# Patient Record
Sex: MALE | Race: WHITE
[De-identification: names, ages, dates, MRNs, and addresses within clinical notes are randomized per-mention and may not be internally consistent; named-entity substitution may affect disease eponyms.]

---

## 2017-09-22 ENCOUNTER — HOSPITAL ENCOUNTER (OUTPATIENT)
Dept: HOSPITAL 45 - C.RAD1850 | Age: 57
Discharge: HOME | End: 2017-09-22
Attending: INTERNAL MEDICINE
Payer: COMMERCIAL

## 2017-09-22 DIAGNOSIS — D86.9: Primary | ICD-10-CM

## 2017-09-22 NOTE — DIAGNOSTIC IMAGING REPORT
CHEST 2 VIEWS ROUTINE



CLINICAL HISTORY: D86.9 [id number]    



COMPARISON STUDY:  7/5/2016



FINDINGS: The cardiac and mediastinal contours are normal. There is no evidence

of focal pulmonary consolidation. There is no evidence of failure. No pleural

effusions are visualized.[ 



IMPRESSION: No active disease in the chest.







Electronically signed by:  Prasad Matthews M.D.

9/22/2017 10:43 AM



Dictated Date/Time:  9/22/2017 10:43 AM

## 2018-05-01 ENCOUNTER — HOSPITAL ENCOUNTER (OUTPATIENT)
Dept: HOSPITAL 45 - C.CPL | Age: 58
Discharge: HOME | End: 2018-05-01
Attending: INTERNAL MEDICINE
Payer: COMMERCIAL

## 2018-05-01 DIAGNOSIS — R07.9: Primary | ICD-10-CM

## 2018-05-01 DIAGNOSIS — R06.02: ICD-10-CM

## 2018-05-01 NOTE — EXERCISE STRESS ECHO
*NOTICE TO RECEIVING PARTY AGENCY**  This information is strictly Confidential and protected under 
[place] law.  [place] law prohibits you from making any further disclosure of this 
information unless further disclosure is expressly permitted by the written consent of the person to 
whom it pertains or is authorized by law.  A general authorization for the release of medical or 
other information is not sufficient for this purpose.  Hospital accepts no responsibility if the 
information is made available to any other person, INCLUDING THE PATIENT.



Interpretation Summary

  *  Name: DRU BLACKBURN  Study Date: 2018 09:32 AM  BP: 170/90 mmHg

  *  MRN: [id number]  Patient Location: [facility]  HR: 75

  *  : 1960 (M/d/yyyy)  Gender: Male  Height: 73 in

  *  Age: 58 yrs  Ethnicity: [place]  Weight: 240 lb

  *  Ordering Physician: Dominick Galvan

  *  Referring Physician: Dominick Galvan

  *  Performed By: Alley Navarro RDCS

  *  Accession# [id number]  Account# [id number]

  *  Reason For Study: Chest pain, shortness of breath

  *  BSA: 2.3 m2

  *  -- Conclusions --

  *  1. Normal stress echocardiogram at 10 METS and a peak heart rate of greater than 100% predicted 
maximum.

  *  2. No exercise-induced chest pain.

  *  3. No EKG changes.

  *  4. Baseline echocardiogram notes normal left ventricular systolic function and a mildly dilated 
aortic root and ascending thoracic aorta.

Procedure Details

  *  ECHOEX, CPT #10153

  *  ECHO DOPPLER, CPT #12419

  *  ECHO COLOR FLOW, CPT #18020

  *  A contrast injection of Definity was performed to improve assessment of LV function.

  *  Contrast was injected into an intravenous site in the right arm.

  *  One vial of Definity ultrasound contrast was diluted in normal saline to a total volume of 10 
ml.  A total of '4' ml of solution was administered during imaging.

  *  Lot # [id number] of Definity utilized for procedure.

  *  Expiration date .

  *  The attending nurse who injected the contrast agent was Rachel Marr RN.

Left Ventricle

  *  The left ventricle is normal in size.

  *  There is normal left ventricular wall thickness.

  *  Ejection Fraction = 55-60%.

  *  Left ventricular systolic function is normal.

  *  Resting wall motion: Normal.  Stress wall motion: Appropriate increase in Left ventricular 
systolic function and decrease in cavity size.  No stress induced segmental wall motion 
abnormalities.

Right Ventricle

  *  The right ventricle is not well visualized.

  *  The right ventricular systolic function is normal as assessed by tricuspid annular plane 
systolic excursion (TAPSE) (normal >1.5 cm).

Atria

  *  The left atrium is mildly dilated.

  *  Right atrial size is normal.

  *  No ASD detected; PFO is not assessed.

Mitral Valve

  *  The mitral valve anatomy is normal.

  *  There is no mitral valve stenosis.

  *  There is mild mitral regurgitation.

Tricuspid Valve

  *  The tricuspid valve anatomy is normal.

  *  There is no tricuspid stenosis.

  *  There is mild tricuspid regurgitation.

Aortic Valve

  *  The aortic valve is normal in structure and function.

  *  No hemodynamically significant valvular aortic stenosis.

  *  Trace aortic regurgitation.

Pulmonic Valve

  *  The pulmonary valve is not well seen, but the Doppler examination is normal without significant 
regurgitation or stenosis.

Great Vessels

  *  Mild aortic root dilatation.

  *  Mildly dilated ascending aorta.

  *  The pulmonary is not well visualized.

Pericardium

  *  There is no pericardial effusion.

Stress Parameters

  *  Normal baseline electrocardiogram.

  *  Stress ECG: No ST changes.  No arrhythmias.

  *  The stress portion of this study was personally supervised by the undersigned interpreting 
physician.

  *  Rest heart rate was '75' BPM.

  *  Rest blood pressure was '170/90'

  *  Maximum heart rate achieved was 173 bpm.

  *  Maximum heart rate was 106 % of maximum age-predicted heart rate.

  *  Maximum blood pressure was '176/80'

  *  Total exercise time was '9:10'

  *  Maximum exercise MET level achieved was '10.3' METS

  *  Maximum treadmill speed was '4.20' miles per hour.

  *  Maximum treadmill elevation was '16.00'% grade.

  *  Exercise was terminated due to 'achieving target heart rate'



MMode 2D Measurements and Calculations

IVSd 1.1 cm



LVIDd 4.7 cm

LVIDs 3.3 cm

LVPWd 0.94 cm



IVS/LVPW 1.2 

FS 29.5 %

EDV(Teich) 101.4 ml

ESV(Teich) 44.1 ml

EF(Teich) 56.5 %



EDV(cubed) 102.6 ml

ESV(cubed) 35.9 ml

EF(cubed) 65.0 %





LV mass(C)d 172.7 grams

LV mass(C)dI 74.3 grams/m\S\2



SV(Teich) 57.3 ml

SI(Teich) 24.7 ml/m\S\2

SV(cubed) 66.7 ml

SI(cubed) 28.7 ml/m\S\2



Ao root diam 3.9 cm

Ao root area 11.9 cm\S\2

ACS 1.7 cm

LA dimension 3.5 cm



asc Aorta Diam 3.7 cm





LA/Ao 0.91 

LVOT diam 2.1 cm

LVOT area 3.3 cm\S\2



LVAd ap4 35.2 cm\S\2

LVLd ap4 8.6 cm

EDV(MOD-sp4) 116.8 ml

EDV(sp4-el) 122.2 ml

LVAs ap4 19.4 cm\S\2

LVLs ap4 6.3 cm

ESV(MOD-sp4) 49.1 ml

ESV(sp4-el) 50.2 ml

EF(MOD-sp4) 58.0 %

EF(sp4-el) 58.9 %



LVAd ap2 34.2 cm\S\2

LVLd ap2 8.7 cm

EDV(MOD-sp2) 115.0 ml

EDV(sp2-el) 113.7 ml

LVAs ap2 20.1 cm\S\2

LVLs ap2 7.0 cm

ESV(MOD-sp2) 47.7 ml

ESV(sp2-el) 49.1 ml

EF(MOD-sp2) 58.6 %

EF(sp2-el) 56.9 %



LVLd %diff 1.2 %

EDV(MOD-bp) 114.6 ml

LVLs %diff 9.2 %

ESV(MOD-bp) 50.8 ml

EF(MOD-bp) 55.7 %





SV(MOD-sp4) 67.7 ml

SI(MOD-sp4) 29.1 ml/m\S\2



SV(MOD-sp2) 67.4 ml

SI(MOD-sp2) 29.0 ml/m\S\2



SV(MOD-bp) 63.8 ml

SI(MOD-bp) 27.4 ml/m\S\2



SV(sp4-el) 72.0 ml

SI(sp4-el) 31.0 ml/m\S\2





SV(sp2-el) 64.7 ml

SI(sp2-el) 27.8 ml/m\S\2













Doppler Measurements and Calculations

MV E max ayush 59.7 cm/sec

MV A max ayush 57.2 cm/sec



MV E/A 1.0 



MV dec time 0.22 sec



Ao V2 max 95.6 cm/sec

Ao max PG 3.7 mmHg

Ao max PG (full) 1.4 mmHg

ASAD(V,A) 2.6 cm\S\2

ASAD(V,D) 2.6 cm\S\2





AI max ayush 238.9 cm/sec

AI max PG 22.8 mmHg

AI dec slope 83.4 cm/sec\S\2

AI P1/2t 839.5 msec



LV V1 max PG 2.2 mmHg



LV V1 max 74.2 cm/sec



PA V2 max 116.7 cm/sec

PA max PG 5.5 mmHg

PA acc slope 428.2 cm/sec\S\2

PA acc time 0.13 sec





TR max ayush 168.8 cm/sec



PA pr(Accel) 20.4 mmHg

## 2018-05-03 ENCOUNTER — HOSPITAL ENCOUNTER (OUTPATIENT)
Dept: HOSPITAL 45 - C.PATHSPEC | Age: 58
Discharge: HOME | End: 2018-05-03
Attending: INTERNAL MEDICINE
Payer: COMMERCIAL

## 2018-05-03 DIAGNOSIS — D18.01: Primary | ICD-10-CM
